# Patient Record
Sex: MALE
[De-identification: names, ages, dates, MRNs, and addresses within clinical notes are randomized per-mention and may not be internally consistent; named-entity substitution may affect disease eponyms.]

---

## 2024-07-26 DIAGNOSIS — M25.561 PAIN IN RIGHT KNEE: ICD-10-CM

## 2024-07-26 DIAGNOSIS — G89.29 PAIN IN RIGHT KNEE: ICD-10-CM

## 2024-07-26 PROBLEM — Z00.00 ENCOUNTER FOR PREVENTIVE HEALTH EXAMINATION: Status: ACTIVE | Noted: 2024-07-26

## 2024-07-29 ENCOUNTER — RESULT REVIEW (OUTPATIENT)
Age: 79
End: 2024-07-29

## 2024-07-29 ENCOUNTER — APPOINTMENT (OUTPATIENT)
Dept: ORTHOPEDIC SURGERY | Facility: CLINIC | Age: 79
End: 2024-07-29
Payer: SELF-PAY

## 2024-07-29 ENCOUNTER — OUTPATIENT (OUTPATIENT)
Dept: OUTPATIENT SERVICES | Facility: HOSPITAL | Age: 79
LOS: 1 days | End: 2024-07-29
Payer: MEDICAID

## 2024-07-29 VITALS
SYSTOLIC BLOOD PRESSURE: 119 MMHG | HEART RATE: 60 BPM | OXYGEN SATURATION: 97 % | WEIGHT: 187.39 LBS | BODY MASS INDEX: 27.76 KG/M2 | HEIGHT: 69 IN | DIASTOLIC BLOOD PRESSURE: 67 MMHG

## 2024-07-29 DIAGNOSIS — I25.10 ATHEROSCLEROTIC HEART DISEASE OF NATIVE CORONARY ARTERY W/OUT ANGINA PECTORIS: ICD-10-CM

## 2024-07-29 DIAGNOSIS — Z82.62 FAMILY HISTORY OF OSTEOPOROSIS: ICD-10-CM

## 2024-07-29 DIAGNOSIS — M17.11 UNILATERAL PRIMARY OSTEOARTHRITIS, RIGHT KNEE: ICD-10-CM

## 2024-07-29 DIAGNOSIS — M25.561 PAIN IN RIGHT KNEE: ICD-10-CM

## 2024-07-29 DIAGNOSIS — I51.9 HEART DISEASE, UNSPECIFIED: ICD-10-CM

## 2024-07-29 DIAGNOSIS — Z83.3 FAMILY HISTORY OF DIABETES MELLITUS: ICD-10-CM

## 2024-07-29 DIAGNOSIS — Z86.79 PERSONAL HISTORY OF OTHER DISEASES OF THE CIRCULATORY SYSTEM: ICD-10-CM

## 2024-07-29 DIAGNOSIS — E03.9 HYPOTHYROIDISM, UNSPECIFIED: ICD-10-CM

## 2024-07-29 DIAGNOSIS — Z82.49 FAMILY HISTORY OF ISCHEMIC HEART DISEASE AND OTHER DISEASES OF THE CIRCULATORY SYSTEM: ICD-10-CM

## 2024-07-29 DIAGNOSIS — Z82.69 FAMILY HISTORY OF OTHER DISEASES OF THE MUSCULOSKELETAL SYSTEM AND CONNECTIVE TISSUE: ICD-10-CM

## 2024-07-29 PROCEDURE — 73564 X-RAY EXAM KNEE 4 OR MORE: CPT

## 2024-07-29 PROCEDURE — 77073 BONE LENGTH STUDIES: CPT | Mod: 26

## 2024-07-29 PROCEDURE — 77073 BONE LENGTH STUDIES: CPT

## 2024-07-29 PROCEDURE — 99204 OFFICE O/P NEW MOD 45 MIN: CPT

## 2024-07-29 PROCEDURE — 73562 X-RAY EXAM OF KNEE 3: CPT | Mod: 26,50,59

## 2024-07-29 RX ORDER — LEVOTHYROXINE SODIUM 0.1 MG/1
100 TABLET ORAL
Refills: 0 | Status: ACTIVE | COMMUNITY

## 2024-07-29 RX ORDER — METOPROLOL SUCCINATE AND HYDROCHLOROTHIAZIDE 12.5; 5 MG/1; MG/1
50-12.5 TABLET ORAL
Refills: 0 | Status: ACTIVE | COMMUNITY

## 2024-07-29 RX ORDER — FOLIC ACID 1 MG/1
1 TABLET ORAL
Refills: 0 | Status: ACTIVE | COMMUNITY

## 2024-07-29 RX ORDER — ATORVASTATIN CALCIUM 40 MG/1
40 TABLET, FILM COATED ORAL
Refills: 0 | Status: ACTIVE | COMMUNITY

## 2024-07-29 NOTE — DISCUSSION/SUMMARY
[de-identified] : Assessment: 79 year old male from [place] who speaks only Colombian with his daughter who works in our cardiothoracic department interpreting today. Mostly right medial knee pain. He has tried conservative treatments but his knee is bowing more and becoming more symptomatic. History of PVD. Bee femoral graft in medial thigh  No significant pain in the PF compartment or lateral joint line on exam.  Right Knee  - Symptoms: Right knee pain medially with weightbearing. No lateral pain or patellofemoral pain. - Exam: Medial joint line tenderness. Laxity of MCL with good endpoint.  - Exam: Intact ACL, PCL, LCL. - Exam: No patellofemoral or lateral joint line tenderness. - Exam: Range of Motion: 3-120 - X-rays: Medial compartment joint space loss in right knee. Severe, Bone on Bone.  - Diagnosis: Medial compartment osteoarthritis, right knee. Severe  11 degrees varus with the left at 5.5 degrees varus and also moderately arthritic. - Non surgical treatments fail to provide adequate relief of osteoarthritis knee symptoms. - Good to Excellent candidate for Medial Compartment Partial Knee Replacement, right knee. This would be less traumatic for him and give him the pain relief he would need without the advanced recovery needs of a total knee.   Plan:   - The patient will consider a Partial Knee Replacement with Dr. Bjorn Paez Jr. MD. - The side, Right.  - The Compartment, Medial  - The procedure will be Derik Robotic-Arm Assisted. - The implants will be cemented. - The implants used are "Restoris MCK" implants from [employer]. - The materials are Titanium tibial implants, CoCr Alloy femoral implants, Polyethylene tibial bearings. - The patient will discuss surgery scheduling with our coordinator - We provided our information packet to take home,review, and prepare for the preoperative visit. - The patient was given the opportunity to schedule their procedure today, or call us when they are ready. - We will assist the patient with all the preoperative scheduling and planning requirements. - Our next visit will be a preoperative visit just prior to surgery to review the procedure, medical clearance, and answer any questions the patient may have.  I look forward to the opportunity to help this patient with their painful knee condition.   Discussion of the risks and benefits of partial knee replacement:  I had a discussion with the patient regarding the findings of their history, exam and imaging. We discussed the option of Partial Knee Replacement using the precision Derik Robotic-Arm System. We discussed the indications, my surgical process, my techniques of surgery, the probable post-operative course and instructions, and the risks and benefits of the procedure.   The knee replacement implants used will be "Restoris MCK" implants from [employer]. The materials are Titanium tibial components, CoCr Alloy femoral components, and Polyethylene (high density plastic) tibial bearings.  The risks of this procedure include but are not limited to the risks of anesthesia, heart attack, stroke, respiratory complications, wound healing problems, skin blisters, delayed wound healing, infection, bleeding, nerve damage, vessel damage, skin sensory changes next to the incisions, loss of motion, scar tissue formation requiring further treatment, blood clots, heterotopic bone formation, implant wear, implant loosening after surgery, allergic responses to the implant materials, continued pain despite proper implant placement, soft tissue pain, and continued knee swelling.  With partial knee replacements there can be progression of arthritis into the other compartments requiring future surgery, and the possible need for further surgery in the future for scar tissue removal, plastic insert change over time, addition of other compartment replacements, and possible revision to total knee replacement.  Although there are no guarantees to success, I feel that the surgery would be very beneficial and there is an excellent chance for a positive outcome.  I look forward to the opportunity to help this patient with their painful knee condition.  The patient had their questions answered to their satisfaction.

## 2024-07-29 NOTE — PHYSICAL EXAM
[de-identified] : Right  Knee/Lower extremity Exam:   Skin: Normal. No erythema, no ecchymosis, no abrasions, no scratches, no tattoos.                    Old Incisions: None.   Knee Joint Swelling: . Positive effusion. Mild   Popliteal Swelling: None	 Pre-Patella Bursa Swelling: None.   Alignment: 		         Varus,  Moderate Passively correctable to near neutral.    Knee Joint Line Tenderness:  Tender at medial joint line.  Not tender at the lateral joint line.    Not tender in the patellofemoral compartment.   Soft Tissue Tenderness: None.   Medial Collateral Ligament Laxity:  Good endpoint.                                                        Medial opening to valgus stress.   Moderate.      Lateral Collateral Ligament Laxity:           Normal laxity. Good endpoint.   ROM Extension: 4 degrees.    ROM Flexion: 120 degrees.      ACL Testing: 			 Stable ACL. Good Endpoint.                                                                 Lachman Test: Negative  Anterior Drawer Test: Negative   PCL Testing: 			 Stable PCL.  Good Endpoint.                                                                Posterior Drawer Test: Negative   Patella Compression Test: Negative  Patellofemoral Crepitus: None.    Patellofemoral Apprehension Testing: Negative.  Patellofemoral Laxity: Normal.  Motor Strength: 			 Quadriceps strength is 5 out of 5                                                                 Hamstring strength is 5 out of 5                                                                Ankle dorsiflexion strength is 5 out of 5                                                               Ankle plantarflexion strength is 5 out of 5   Sensation: Light tough sensation in the lower extremity is grossly normal.                                       Pulses: 				 Pulses are palpable at the ankle at the Dorsalis Pedis Artery.                                                                Pulses are palpable at the Posterior Tibialis Artery.                                                                 Hip Motion: The patient has full and painless hip range of motion.                                                          Lumbar Spine Symptoms: Negative straight leg raise.                                                                Gait: Limping Gait.  Abnormal gait.    Assistive Devices: 	None   [de-identified] : X-ray of the Left Knee:   AP Standing View: Medial joint space loss. Moderate Lateral joint space preserved with tibial plateau rim osteophytes posteriorly.   PA Flexion View: Medial joint space loss.Moderate to severe Lateral joint space preserved.     Lateral View: Patellofemoral joint space preserved. small inferior osteophytes.   [place] View: Patellofemoral joint space is preserved. Patellofemoral joint central tracking. Large medial femoral osteophytes. small lateral femoral osteophytes.       X-ray of the Right Knee:   AP Standing View: Medial joint space loss. Severe. Lateral joint space preserved. Lateral joint space preserved with tibial plateau rim osteophytes posteriorly.  PA Flexion View: Medial joint space loss. Severe. Lateral joint space preserved with some mild lateral femoral and tibial osteophytes.   Lateral View: Patellofemoral joint space preserved. Moderate anterior femoral osteophytes and inferior patella osteophytes. anterior tibial osteophytes.  [place] View: Patellofemoral joint space is preserved. Patellofemoral joint central tracking. Large medial femoral osteophytes. Mild lateral femoral osteophytes.   Bilateral Hip to Ankle Standing View: Alignment: Varus 11 degrees on the right and 5.5 on the left. Medial joint space loss. Severe. Hips: No significant changes

## 2024-07-29 NOTE — HISTORY OF PRESENT ILLNESS
[de-identified] : Bunny is a 79 year old male who presents today right knee pain. He states the pain started years ago. He describes medial pain, no significant lateral and knee cap pain. He has tried rest, heat, acupuncture, cane, NSAIDs and PT in the past to help with the pain. He was referred by Dr. Kline and Shanel..  [ Right Knee  ]  Onset of Knee Symptoms: years ago  Knee Symptoms: Pain with Activity, Walking and Decrease Walk Distance  Location of Pain: Inner Side of Knee  Duration of Pain: Constant  Intensity of Pain: Pain Level: mild 2  Character of Pain: Aching  Painful Activities: Walking, Walking after Sitting, Rising From a Seat, Getting On / Off a Toilet  Knee History: History of Osteoarthritis  Non-Surgical Knee Treatments: Rest, Heat, Acupuncture, Cane, Walker, NSAIDs and Physical Therapy  Surgical Knee History: none

## 2024-07-29 NOTE — DISCUSSION/SUMMARY
[de-identified] : Assessment: 79 year old male from [place] who speaks only Puerto Rican with his daughter who works in our cardiothoracic department interpreting today. Mostly right medial knee pain. He has tried conservative treatments but his knee is bowing more and becoming more symptomatic. History of PVD. Bee femoral graft in medial thigh  No significant pain in the PF compartment or lateral joint line on exam.  Right Knee  - Symptoms: Right knee pain medially with weightbearing. No lateral pain or patellofemoral pain. - Exam: Medial joint line tenderness. Laxity of MCL with good endpoint.  - Exam: Intact ACL, PCL, LCL. - Exam: No patellofemoral or lateral joint line tenderness. - Exam: Range of Motion: 3-120 - X-rays: Medial compartment joint space loss in right knee. Severe, Bone on Bone.  - Diagnosis: Medial compartment osteoarthritis, right knee. Severe  11 degrees varus with the left at 5.5 degrees varus and also moderately arthritic. - Non surgical treatments fail to provide adequate relief of osteoarthritis knee symptoms. - Good to Excellent candidate for Medial Compartment Partial Knee Replacement, right knee. This would be less traumatic for him and give him the pain relief he would need without the advanced recovery needs of a total knee.   Plan:   - The patient will consider a Partial Knee Replacement with Dr. Bjorn Paez Jr. MD. - The side, Right.  - The Compartment, Medial  - The procedure will be Derik Robotic-Arm Assisted. - The implants will be cemented. - The implants used are "Restoris MCK" implants from [employer]. - The materials are Titanium tibial implants, CoCr Alloy femoral implants, Polyethylene tibial bearings. - The patient will discuss surgery scheduling with our coordinator - We provided our information packet to take home,review, and prepare for the preoperative visit. - The patient was given the opportunity to schedule their procedure today, or call us when they are ready. - We will assist the patient with all the preoperative scheduling and planning requirements. - Our next visit will be a preoperative visit just prior to surgery to review the procedure, medical clearance, and answer any questions the patient may have.  I look forward to the opportunity to help this patient with their painful knee condition.   Discussion of the risks and benefits of partial knee replacement:  I had a discussion with the patient regarding the findings of their history, exam and imaging. We discussed the option of Partial Knee Replacement using the precision Derik Robotic-Arm System. We discussed the indications, my surgical process, my techniques of surgery, the probable post-operative course and instructions, and the risks and benefits of the procedure.   The knee replacement implants used will be "Restoris MCK" implants from [employer]. The materials are Titanium tibial components, CoCr Alloy femoral components, and Polyethylene (high density plastic) tibial bearings.  The risks of this procedure include but are not limited to the risks of anesthesia, heart attack, stroke, respiratory complications, wound healing problems, skin blisters, delayed wound healing, infection, bleeding, nerve damage, vessel damage, skin sensory changes next to the incisions, loss of motion, scar tissue formation requiring further treatment, blood clots, heterotopic bone formation, implant wear, implant loosening after surgery, allergic responses to the implant materials, continued pain despite proper implant placement, soft tissue pain, and continued knee swelling.  With partial knee replacements there can be progression of arthritis into the other compartments requiring future surgery, and the possible need for further surgery in the future for scar tissue removal, plastic insert change over time, addition of other compartment replacements, and possible revision to total knee replacement.  Although there are no guarantees to success, I feel that the surgery would be very beneficial and there is an excellent chance for a positive outcome.  I look forward to the opportunity to help this patient with their painful knee condition.  The patient had their questions answered to their satisfaction.

## 2024-07-29 NOTE — PHYSICAL EXAM
[de-identified] : Right  Knee/Lower extremity Exam:   Skin: Normal. No erythema, no ecchymosis, no abrasions, no scratches, no tattoos.                    Old Incisions: None.   Knee Joint Swelling: . Positive effusion. Mild   Popliteal Swelling: None	 Pre-Patella Bursa Swelling: None.   Alignment: 		         Varus,  Moderate Passively correctable to near neutral.    Knee Joint Line Tenderness:  Tender at medial joint line.  Not tender at the lateral joint line.    Not tender in the patellofemoral compartment.   Soft Tissue Tenderness: None.   Medial Collateral Ligament Laxity:  Good endpoint.                                                        Medial opening to valgus stress.   Moderate.      Lateral Collateral Ligament Laxity:           Normal laxity. Good endpoint.   ROM Extension: 4 degrees.    ROM Flexion: 120 degrees.      ACL Testing: 			 Stable ACL. Good Endpoint.                                                                 Lachman Test: Negative  Anterior Drawer Test: Negative   PCL Testing: 			 Stable PCL.  Good Endpoint.                                                                Posterior Drawer Test: Negative   Patella Compression Test: Negative  Patellofemoral Crepitus: None.    Patellofemoral Apprehension Testing: Negative.  Patellofemoral Laxity: Normal.  Motor Strength: 			 Quadriceps strength is 5 out of 5                                                                 Hamstring strength is 5 out of 5                                                                Ankle dorsiflexion strength is 5 out of 5                                                               Ankle plantarflexion strength is 5 out of 5   Sensation: Light tough sensation in the lower extremity is grossly normal.                                       Pulses: 				 Pulses are palpable at the ankle at the Dorsalis Pedis Artery.                                                                Pulses are palpable at the Posterior Tibialis Artery.                                                                 Hip Motion: The patient has full and painless hip range of motion.                                                          Lumbar Spine Symptoms: Negative straight leg raise.                                                                Gait: Limping Gait.  Abnormal gait.    Assistive Devices: 	None   [de-identified] : X-ray of the Left Knee:   AP Standing View: Medial joint space loss. Moderate Lateral joint space preserved with tibial plateau rim osteophytes posteriorly.   PA Flexion View: Medial joint space loss.Moderate to severe Lateral joint space preserved.     Lateral View: Patellofemoral joint space preserved. small inferior osteophytes.   [place] View: Patellofemoral joint space is preserved. Patellofemoral joint central tracking. Large medial femoral osteophytes. small lateral femoral osteophytes.       X-ray of the Right Knee:   AP Standing View: Medial joint space loss. Severe. Lateral joint space preserved. Lateral joint space preserved with tibial plateau rim osteophytes posteriorly.  PA Flexion View: Medial joint space loss. Severe. Lateral joint space preserved with some mild lateral femoral and tibial osteophytes.   Lateral View: Patellofemoral joint space preserved. Moderate anterior femoral osteophytes and inferior patella osteophytes. anterior tibial osteophytes.  [place] View: Patellofemoral joint space is preserved. Patellofemoral joint central tracking. Large medial femoral osteophytes. Mild lateral femoral osteophytes.   Bilateral Hip to Ankle Standing View: Alignment: Varus 11 degrees on the right and 5.5 on the left. Medial joint space loss. Severe. Hips: No significant changes

## 2024-07-29 NOTE — HISTORY OF PRESENT ILLNESS
[de-identified] : Bunny is a 79 year old male who presents today right knee pain. He states the pain started years ago. He describes medial pain, no significant lateral and knee cap pain. He has tried rest, heat, acupuncture, cane, NSAIDs and PT in the past to help with the pain. He was referred by Dr. Kline and Shanel..  [ Right Knee  ]  Onset of Knee Symptoms: years ago  Knee Symptoms: Pain with Activity, Walking and Decrease Walk Distance  Location of Pain: Inner Side of Knee  Duration of Pain: Constant  Intensity of Pain: Pain Level: mild 2  Character of Pain: Aching  Painful Activities: Walking, Walking after Sitting, Rising From a Seat, Getting On / Off a Toilet  Knee History: History of Osteoarthritis  Non-Surgical Knee Treatments: Rest, Heat, Acupuncture, Cane, Walker, NSAIDs and Physical Therapy  Surgical Knee History: none

## 2024-08-06 ENCOUNTER — APPOINTMENT (OUTPATIENT)
Dept: OTOLARYNGOLOGY | Facility: CLINIC | Age: 79
End: 2024-08-06

## 2024-08-06 PROBLEM — H61.23 BILATERAL IMPACTED CERUMEN: Status: ACTIVE | Noted: 2024-08-06

## 2024-08-06 PROBLEM — H93.13 BILATERAL TINNITUS: Status: ACTIVE | Noted: 2024-08-06

## 2024-08-06 PROBLEM — H90.3 BILATERAL SENSORINEURAL HEARING LOSS: Status: ACTIVE | Noted: 2024-08-06

## 2024-08-06 PROCEDURE — 69210 REMOVE IMPACTED EAR WAX UNI: CPT

## 2024-08-06 PROCEDURE — 92550 TYMPANOMETRY & REFLEX THRESH: CPT | Mod: 52

## 2024-08-06 PROCEDURE — 92557 COMPREHENSIVE HEARING TEST: CPT

## 2024-08-06 NOTE — ASSESSMENT
[FreeTextEntry1] : RTC for an ear cleaning in 6 months; annual audios, sooner prn any changes. HAE as requested

## 2024-08-06 NOTE — HISTORY OF PRESENT ILLNESS
[de-identified] : Mostly Farsi speaker; his daughter translated by request and assisted w/ the hx Hearing loss developing over a 2 yr period; he uses headphones to be able to hear the news. Nonpulsatile tinnitus in both ears from time to time. Denies: ear pain, drainage, frequent loud noise exp/shooting, frequent infections, hx of ear surgery or IV antibiotics/chemo; denies a FHx of hearing loss. Qtip use: no

## 2024-08-06 NOTE — PHYSICAL EXAM
[Binocular Microscopic Exam] : Binocular microscopic exam was performed [FreeTextEntry8] : Obstructing cerumen was removed with a hook & suction [FreeTextEntry9] : Obstructing cerumen was removed with a hook & suction [Normal] : no masses and lesions seen, face is symmetric

## 2024-08-06 NOTE — HISTORY OF PRESENT ILLNESS
[de-identified] : Mostly Farsi speaker; his daughter translated by request and assisted w/ the hx Hearing loss developing over a 2 yr period; he uses headphones to be able to hear the news. Nonpulsatile tinnitus in both ears from time to time. Denies: ear pain, drainage, frequent loud noise exp/shooting, frequent infections, hx of ear surgery or IV antibiotics/chemo; denies a FHx of hearing loss. Qtip use: no

## 2024-08-06 NOTE — DATA REVIEWED
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

## 2024-08-06 NOTE — DATA REVIEWED
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

## 2024-10-31 ENCOUNTER — OUTPATIENT (OUTPATIENT)
Dept: OUTPATIENT SERVICES | Facility: HOSPITAL | Age: 79
LOS: 1 days | End: 2024-10-31
Payer: MEDICAID

## 2024-10-31 ENCOUNTER — RESULT REVIEW (OUTPATIENT)
Age: 79
End: 2024-10-31

## 2024-10-31 DIAGNOSIS — R01.1 CARDIAC MURMUR, UNSPECIFIED: ICD-10-CM

## 2024-10-31 PROCEDURE — 93306 TTE W/DOPPLER COMPLETE: CPT | Mod: 26

## 2024-10-31 PROCEDURE — 93306 TTE W/DOPPLER COMPLETE: CPT

## 2024-12-17 ENCOUNTER — APPOINTMENT (OUTPATIENT)
Dept: HEART AND VASCULAR | Facility: CLINIC | Age: 79
End: 2024-12-17

## 2024-12-17 ENCOUNTER — NON-APPOINTMENT (OUTPATIENT)
Age: 79
End: 2024-12-17

## 2024-12-17 VITALS
BODY MASS INDEX: 28.14 KG/M2 | HEIGHT: 69 IN | OXYGEN SATURATION: 98 % | DIASTOLIC BLOOD PRESSURE: 73 MMHG | HEART RATE: 82 BPM | WEIGHT: 190 LBS | SYSTOLIC BLOOD PRESSURE: 115 MMHG

## 2024-12-17 PROCEDURE — 99202 OFFICE O/P NEW SF 15 MIN: CPT

## 2024-12-17 PROCEDURE — 93000 ELECTROCARDIOGRAM COMPLETE: CPT | Mod: NC

## 2024-12-17 RX ORDER — METOPROLOL TARTRATE 25 MG/1
25 TABLET ORAL
Refills: 0 | Status: ACTIVE | COMMUNITY

## 2024-12-31 DIAGNOSIS — Z47.1 AFTERCARE FOLLOWING JOINT REPLACEMENT SURGERY: ICD-10-CM

## 2024-12-31 DIAGNOSIS — Z01.812 ENCOUNTER FOR PREPROCEDURAL LABORATORY EXAMINATION: ICD-10-CM

## 2024-12-31 DIAGNOSIS — Z22.322 CARRIER OR SUSPECTED CARRIER OF METHICILLIN RESISTANT STAPHYLOCOCCUS AUREUS: ICD-10-CM

## 2024-12-31 DIAGNOSIS — Z96.651 PRESENCE OF RIGHT ARTIFICIAL KNEE JOINT: ICD-10-CM

## 2024-12-31 DIAGNOSIS — Z96.651 AFTERCARE FOLLOWING JOINT REPLACEMENT SURGERY: ICD-10-CM

## 2025-01-07 ENCOUNTER — NON-APPOINTMENT (OUTPATIENT)
Age: 80
End: 2025-01-07

## 2025-01-07 ASSESSMENT — KOOS JR
STRAIGHTENING KNEE FULLY: MODERATE
KOOS JR RAW SCORE: 15
BENDING TO THE FLOOR TO PICK UP OBJECT: MILD
RISING FROM SITTING: MODERATE
STANDING UPRIGHT: MODERATE
HOW SEVERE IS YOUR KNEE STIFFNESS AFTER FIRST WAKING IN MORNING: SEVERE
GOING UP OR DOWN STAIRS: MODERATE
TWISING OR PIVOTING ON KNEE: SEVERE

## 2025-01-08 ENCOUNTER — NON-APPOINTMENT (OUTPATIENT)
Age: 80
End: 2025-01-08

## 2025-01-10 ENCOUNTER — LABORATORY RESULT (OUTPATIENT)
Age: 80
End: 2025-01-10

## 2025-01-10 ENCOUNTER — APPOINTMENT (OUTPATIENT)
Dept: ORTHOPEDIC SURGERY | Facility: CLINIC | Age: 80
End: 2025-01-10
Payer: SELF-PAY

## 2025-01-10 ENCOUNTER — OUTPATIENT (OUTPATIENT)
Dept: OUTPATIENT SERVICES | Facility: HOSPITAL | Age: 80
LOS: 1 days | End: 2025-01-10
Payer: MEDICAID

## 2025-01-10 ENCOUNTER — NON-APPOINTMENT (OUTPATIENT)
Age: 80
End: 2025-01-10

## 2025-01-10 ENCOUNTER — APPOINTMENT (OUTPATIENT)
Dept: CT IMAGING | Facility: CLINIC | Age: 80
End: 2025-01-10

## 2025-01-10 VITALS
OXYGEN SATURATION: 96 % | DIASTOLIC BLOOD PRESSURE: 75 MMHG | BODY MASS INDEX: 28.14 KG/M2 | SYSTOLIC BLOOD PRESSURE: 119 MMHG | WEIGHT: 190 LBS | HEIGHT: 69 IN | HEART RATE: 75 BPM

## 2025-01-10 DIAGNOSIS — M17.11 UNILATERAL PRIMARY OSTEOARTHRITIS, RIGHT KNEE: ICD-10-CM

## 2025-01-10 DIAGNOSIS — M25.561 PAIN IN RIGHT KNEE: ICD-10-CM

## 2025-01-10 DIAGNOSIS — G89.29 PAIN IN RIGHT KNEE: ICD-10-CM

## 2025-01-10 PROCEDURE — 73700 CT LOWER EXTREMITY W/O DYE: CPT

## 2025-01-10 PROCEDURE — 99215 OFFICE O/P EST HI 40 MIN: CPT

## 2025-01-10 PROCEDURE — 73700 CT LOWER EXTREMITY W/O DYE: CPT | Mod: 26,RT

## 2025-01-13 LAB
ALBUMIN SERPL ELPH-MCNC: 4.6 G/DL
ALP BLD-CCNC: 71 U/L
ALT SERPL-CCNC: 45 U/L
ANION GAP SERPL CALC-SCNC: 11 MMOL/L
APPEARANCE: CLEAR
APTT BLD: 28.9 SEC
AST SERPL-CCNC: 25 U/L
BACTERIA UR CULT: NORMAL
BACTERIA: NEGATIVE /HPF
BASOPHILS # BLD AUTO: 0.03 K/UL
BASOPHILS NFR BLD AUTO: 0.4 %
BILIRUB SERPL-MCNC: 1.1 MG/DL
BILIRUBIN URINE: NEGATIVE
BLOOD URINE: NEGATIVE
BUN SERPL-MCNC: 12 MG/DL
CALCIUM SERPL-MCNC: 8.7 MG/DL
CAST: 0 /LPF
CHLORIDE SERPL-SCNC: 101 MMOL/L
CO2 SERPL-SCNC: 25 MMOL/L
COLOR: YELLOW
CREAT SERPL-MCNC: 0.79 MG/DL
EGFR: 90 ML/MIN/1.73M2
EOSINOPHIL # BLD AUTO: 0.17 K/UL
EOSINOPHIL NFR BLD AUTO: 2.2 %
EPITHELIAL CELLS: 0 /HPF
ESTIMATED AVERAGE GLUCOSE: 111 MG/DL
GLUCOSE QUALITATIVE U: NEGATIVE MG/DL
GLUCOSE SERPL-MCNC: 76 MG/DL
HBA1C MFR BLD HPLC: 5.5 %
HCT VFR BLD CALC: 26.6 %
HGB BLD-MCNC: 8.4 G/DL
IMM GRANULOCYTES NFR BLD AUTO: 1 %
INR PPP: 0.94 RATIO
KETONES URINE: NEGATIVE MG/DL
LEUKOCYTE ESTERASE URINE: ABNORMAL
LYMPHOCYTES # BLD AUTO: 2.08 K/UL
LYMPHOCYTES NFR BLD AUTO: 27 %
MAN DIFF?: NORMAL
MCHC RBC-ENTMCNC: 22 PG
MCHC RBC-ENTMCNC: 31.6 G/DL
MCV RBC AUTO: 69.8 FL
MICROSCOPIC-UA: NORMAL
MONOCYTES # BLD AUTO: 0.73 K/UL
MONOCYTES NFR BLD AUTO: 9.5 %
MRSA SPEC QL CULT: NOT DETECTED
NEUTROPHILS # BLD AUTO: 4.61 K/UL
NEUTROPHILS NFR BLD AUTO: 59.9 %
NITRITE URINE: NEGATIVE
PH URINE: 6
PLATELET # BLD AUTO: 262 K/UL
POTASSIUM SERPL-SCNC: 4.5 MMOL/L
PROT SERPL-MCNC: 7.4 G/DL
PROTEIN URINE: NEGATIVE MG/DL
PT BLD: 11.1 SEC
RBC # BLD: 3.81 M/UL
RBC # FLD: 16.8 %
RED BLOOD CELLS URINE: 1 /HPF
SODIUM SERPL-SCNC: 137 MMOL/L
SPECIFIC GRAVITY URINE: 1.01
STAPH AUREUS (SA): NOT DETECTED
TSH SERPL-ACNC: 1.53 UIU/ML
UROBILINOGEN URINE: 0.2 MG/DL
WBC # FLD AUTO: 7.7 K/UL
WHITE BLOOD CELLS URINE: 1 /HPF

## 2025-01-16 RX ORDER — CEFADROXIL 500 MG/1
500 CAPSULE ORAL TWICE DAILY
Qty: 14 | Refills: 1 | Status: ACTIVE | COMMUNITY
Start: 2025-01-16 | End: 1900-01-01

## 2025-01-16 RX ORDER — HYDROCODONE BITARTRATE AND ACETAMINOPHEN 7.5; 325 MG/1; MG/1
7.5-325 TABLET ORAL
Qty: 24 | Refills: 0 | Status: ACTIVE | COMMUNITY
Start: 2025-01-16 | End: 1900-01-01

## 2025-01-22 VITALS
HEART RATE: 70 BPM | TEMPERATURE: 97 F | RESPIRATION RATE: 16 BRPM | DIASTOLIC BLOOD PRESSURE: 67 MMHG | OXYGEN SATURATION: 96 % | SYSTOLIC BLOOD PRESSURE: 119 MMHG | WEIGHT: 182.98 LBS | HEIGHT: 68 IN

## 2025-01-22 RX ORDER — TAMSULOSIN HYDROCHLORIDE 0.4 MG/1
1 CAPSULE ORAL
Refills: 0 | DISCHARGE

## 2025-01-22 RX ORDER — ATORVASTATIN CALCIUM 40 MG/1
1 TABLET, FILM COATED ORAL
Refills: 0 | DISCHARGE

## 2025-01-22 RX ORDER — POVIDONE IODINE USP, 10% W/W 10 MG/ML
1 SWAB TOPICAL ONCE
Refills: 0 | Status: DISCONTINUED | OUTPATIENT
Start: 2025-01-23 | End: 2025-01-23

## 2025-01-22 NOTE — H&P ADULT - NSICDXPASTMEDICALHX_GEN_ALL_CORE_FT
PAST MEDICAL HISTORY:  Osteoarthritis of right knee      PAST MEDICAL HISTORY:  CAD (coronary artery disease)     Glaucoma     Osteoarthritis of right knee     PVD (peripheral vascular disease)

## 2025-01-22 NOTE — ASU PATIENT PROFILE, ADULT - FALL HARM RISK - HARM RISK INTERVENTIONS

## 2025-01-22 NOTE — ASU PATIENT PROFILE, ADULT - NS PRO PASSIVE SMOKE EXP
no hearing difficulty/no ear pain/no tinnitus/no vertigo/no nasal discharge/no post-nasal discharge/no nose bleeds/no recurrent cold sores/no abnormal taste sensation/no gum bleeding/no throat pain No

## 2025-01-22 NOTE — H&P ADULT - NSHPPHYSICALEXAM_GEN_ALL_CORE
MSK: + decreased ROM 2/2 pain, right knee         Remainder of exam per medical clearance note Gen: Alert and oriented, NAD  MSK: + decreased ROM 2/2 pain, right knee   No evidence of deformity or open wound   2+ DP pulses palpable bilaterally, skin wwp, cap refill brisk   SILT to bilateral distal lower extremities  EHL/FHL/TA/GS 5/5 bilaterally    Remainder of exam per medical clearance note

## 2025-01-22 NOTE — PRE-OP CHECKLIST - WAS PATIENT ON BETA BLOCKER?
April calling from [facility]# [phone]  Karie Webster RN Nurse Triage     No

## 2025-01-22 NOTE — H&P ADULT - PROBLEM SELECTOR PLAN 1
Admit to Orthopaedic Service.  Presents today for elective right UKR   Pt medically stable and cleared for procedure today by Dr. Howe

## 2025-01-22 NOTE — H&P ADULT - HISTORY OF PRESENT ILLNESS
79M c/o right knee pain x   Has the patient used any narcotic more than 90 days prior to the date of surgery? YES or NO     Present for unicondylar right knee arthroplasty  79M c/o right knee pain x many years. History obtained primarily from patient's daughter. No prior surgeries to his right knee. No numbness/tingling. He takes Aleve and Naproxen occasionally at home for pain with minimal relief of symptoms. Pain persists despite conservative measures. Ambulates occasionally with the assistance of a cane.   Denies history of blood clots/seizures. Denies CP/SOB/N/V.     Has the patient used any narcotic more than 90 days prior to the date of surgery?  NO     Present for unicondylar right knee arthroplasty

## 2025-01-22 NOTE — H&P ADULT - NSHPLABSRESULTS_GEN_ALL_CORE
Preop CBC, BMP, PT/PTT/INR, UA - WNL per medical clearance   H/H 8.41/26.6   A1C 5.5   Cr .79   Preop EKG - sinus rhythm -  WNL per medical clearance   Nasal swab negative   Echo 10/31/24 EF 55-60%    DOS

## 2025-01-22 NOTE — ASU PATIENT PROFILE, ADULT - NSICDXPASTMEDICALHX_GEN_ALL_CORE_FT
PAST MEDICAL HISTORY:  CAD (coronary artery disease)     Osteoarthritis of right knee     PVD (peripheral vascular disease)      PAST MEDICAL HISTORY:  CAD (coronary artery disease)     Glaucoma     Osteoarthritis of right knee     PVD (peripheral vascular disease)

## 2025-01-22 NOTE — ASU PATIENT PROFILE, ADULT - NSICDXPASTSURGICALHX_GEN_ALL_CORE_FT
PAST SURGICAL HISTORY:  No significant past surgical history PAST SURGICAL HISTORY:  Dissection of left femoral artery graft    H/O hernia repair

## 2025-01-23 ENCOUNTER — RESULT REVIEW (OUTPATIENT)
Age: 80
End: 2025-01-23

## 2025-01-23 ENCOUNTER — APPOINTMENT (OUTPATIENT)
Dept: ORTHOPEDIC SURGERY | Facility: HOSPITAL | Age: 80
End: 2025-01-23

## 2025-01-23 ENCOUNTER — OUTPATIENT (OUTPATIENT)
Dept: OUTPATIENT SERVICES | Facility: HOSPITAL | Age: 80
LOS: 1 days | Discharge: HOME | End: 2025-01-23
Payer: MEDICAID

## 2025-01-23 VITALS — OXYGEN SATURATION: 99 % | DIASTOLIC BLOOD PRESSURE: 66 MMHG | HEART RATE: 77 BPM | SYSTOLIC BLOOD PRESSURE: 110 MMHG

## 2025-01-23 DIAGNOSIS — I25.10 ATHEROSCLEROTIC HEART DISEASE OF NATIVE CORONARY ARTERY WITHOUT ANGINA PECTORIS: ICD-10-CM

## 2025-01-23 DIAGNOSIS — M17.11 UNILATERAL PRIMARY OSTEOARTHRITIS, RIGHT KNEE: ICD-10-CM

## 2025-01-23 DIAGNOSIS — Z98.890 OTHER SPECIFIED POSTPROCEDURAL STATES: Chronic | ICD-10-CM

## 2025-01-23 DIAGNOSIS — I77.77 DISSECTION OF ARTERY OF LOWER EXTREMITY: Chronic | ICD-10-CM

## 2025-01-23 DIAGNOSIS — I73.9 PERIPHERAL VASCULAR DISEASE, UNSPECIFIED: ICD-10-CM

## 2025-01-23 PROCEDURE — 27446 REVISION OF KNEE JOINT: CPT | Mod: AS,RT

## 2025-01-23 PROCEDURE — 86850 RBC ANTIBODY SCREEN: CPT

## 2025-01-23 PROCEDURE — 86901 BLOOD TYPING SEROLOGIC RH(D): CPT

## 2025-01-23 PROCEDURE — C1713: CPT

## 2025-01-23 PROCEDURE — 27437 REVISE KNEECAP: CPT | Mod: 59,RT

## 2025-01-23 PROCEDURE — 27446 REVISION OF KNEE JOINT: CPT | Mod: RT

## 2025-01-23 PROCEDURE — 73560 X-RAY EXAM OF KNEE 1 OR 2: CPT | Mod: 26,RT

## 2025-01-23 PROCEDURE — 97161 PT EVAL LOW COMPLEX 20 MIN: CPT

## 2025-01-23 PROCEDURE — S2900: CPT

## 2025-01-23 PROCEDURE — C1776: CPT

## 2025-01-23 PROCEDURE — 86900 BLOOD TYPING SEROLOGIC ABO: CPT

## 2025-01-23 PROCEDURE — 73560 X-RAY EXAM OF KNEE 1 OR 2: CPT

## 2025-01-23 DEVICE — IMPLANTABLE DEVICE: Type: IMPLANTABLE DEVICE | Site: RIGHT | Status: FUNCTIONAL

## 2025-01-23 DEVICE — MAKO BONE PIN 3.2MM X 110MM: Type: IMPLANTABLE DEVICE | Site: RIGHT | Status: FUNCTIONAL

## 2025-01-23 DEVICE — MAKO BONE PIN 3.2MM X 140MM: Type: IMPLANTABLE DEVICE | Site: RIGHT | Status: FUNCTIONAL

## 2025-01-23 DEVICE — CEMENT SIMPLEX WITH TOBRAMYCIN: Type: IMPLANTABLE DEVICE | Site: RIGHT | Status: FUNCTIONAL

## 2025-01-23 RX ORDER — OXYCODONE HCL 15 MG
10 TABLET ORAL
Refills: 0 | Status: DISCONTINUED | OUTPATIENT
Start: 2025-01-23 | End: 2025-01-23

## 2025-01-23 RX ORDER — APREPITANT 40 MG/1
40 CAPSULE ORAL ONCE
Refills: 0 | Status: COMPLETED | OUTPATIENT
Start: 2025-01-23 | End: 2025-01-23

## 2025-01-23 RX ORDER — SENNOSIDES 8.6 MG/1
2 TABLET, FILM COATED ORAL AT BEDTIME
Refills: 0 | Status: DISCONTINUED | OUTPATIENT
Start: 2025-01-23 | End: 2025-01-23

## 2025-01-23 RX ORDER — LEVOTHYROXINE SODIUM 175 UG/1
1 TABLET ORAL
Refills: 0 | DISCHARGE

## 2025-01-23 RX ORDER — OXYCODONE HCL 15 MG
5 TABLET ORAL
Refills: 0 | Status: DISCONTINUED | OUTPATIENT
Start: 2025-01-23 | End: 2025-01-23

## 2025-01-23 RX ORDER — ACETAMINOPHEN 80 MG/.8ML
1000 SOLUTION/ DROPS ORAL EVERY 8 HOURS
Refills: 0 | Status: DISCONTINUED | OUTPATIENT
Start: 2025-01-23 | End: 2025-01-23

## 2025-01-23 RX ORDER — ONDANSETRON 4 MG/1
4 TABLET ORAL EVERY 6 HOURS
Refills: 0 | Status: DISCONTINUED | OUTPATIENT
Start: 2025-01-23 | End: 2025-01-23

## 2025-01-23 RX ORDER — POLYETHYLENE GLYCOL 3350 17 G/DOSE
17 POWDER (GRAM) ORAL AT BEDTIME
Refills: 0 | Status: DISCONTINUED | OUTPATIENT
Start: 2025-01-23 | End: 2025-01-23

## 2025-01-23 RX ORDER — SODIUM CHLORIDE 9 MG/ML
1000 INJECTION, SOLUTION INTRAVENOUS
Refills: 0 | Status: DISCONTINUED | OUTPATIENT
Start: 2025-01-23 | End: 2025-01-23

## 2025-01-23 RX ORDER — ACETAMINOPHEN 80 MG/.8ML
1000 SOLUTION/ DROPS ORAL ONCE
Refills: 0 | Status: COMPLETED | OUTPATIENT
Start: 2025-01-23 | End: 2025-01-23

## 2025-01-23 RX ORDER — CHLORHEXIDINE GLUCONATE 1.2 MG/ML
1 RINSE ORAL EVERY 12 HOURS
Refills: 0 | Status: DISCONTINUED | OUTPATIENT
Start: 2025-01-23 | End: 2025-01-23

## 2025-01-23 RX ORDER — ASPIRIN 81 MG
1 TABLET, DELAYED RELEASE (ENTERIC COATED) ORAL
Qty: 0 | Refills: 0 | DISCHARGE

## 2025-01-23 RX ORDER — ACETAMINOPHEN 80 MG/.8ML
2 SOLUTION/ DROPS ORAL
Qty: 100 | Refills: 0
Start: 2025-01-23

## 2025-01-23 RX ORDER — MAGNESIUM HYDROXIDE 400 MG/5ML
30 SUSPENSION, ORAL (FINAL DOSE FORM) ORAL DAILY
Refills: 0 | Status: DISCONTINUED | OUTPATIENT
Start: 2025-01-23 | End: 2025-01-23

## 2025-01-23 RX ORDER — HYDROMORPHONE HCL 4 MG
0.5 TABLET ORAL
Refills: 0 | Status: DISCONTINUED | OUTPATIENT
Start: 2025-01-23 | End: 2025-01-23

## 2025-01-23 RX ADMIN — APREPITANT 40 MILLIGRAM(S): 40 CAPSULE ORAL at 06:43

## 2025-01-23 RX ADMIN — ACETAMINOPHEN 1000 MILLIGRAM(S): 80 SOLUTION/ DROPS ORAL at 06:43

## 2025-01-23 RX ADMIN — CHLORHEXIDINE GLUCONATE 1 APPLICATION(S): 1.2 RINSE ORAL at 06:47

## 2025-01-23 NOTE — PHYSICAL THERAPY INITIAL EVALUATION ADULT - ADDITIONAL COMMENTS
Patient lives with spouse and daughter in elevator accessible apartment. Does not use any Assistive Devices at baseline. Denies recent Hx of falls.

## 2025-01-23 NOTE — ASU DISCHARGE PLAN (ADULT/PEDIATRIC) - ASU DC SPECIAL INSTRUCTIONSFT
Please follow Dr. Paez’s instruction sheets  ACTIVITY:   - Weight bear as tolerated with assistive device. No strenuous activity, heavy lifting, driving or returning to work until cleared by MD.   - Apply a cryocuff or ice to the surgical site as frequently as possible. Ice should NEVER be placed directly on the skin. Wearing compression stockings during the first week after surgery can help reduce swelling in your knee, calf and foot.     DO place a pillow under your heel when elevating the leg, to encourage full extension of the knee. Do NOT place a pillow behind your knee for comfort, as this can lead to permanent difficulty straightening your leg. It is normal to develop some swelling in the leg, ankle, and foot because of gravity.     DRESSING/SHOWERING:   The ace bandages will be removed in first postop visit. The dressings underneath are water resistant and you may then shower. No soaking in tubs, keep dry at all times. No ointments or creams to incision.    MEDICATION/ANTICOAGULATION:   -You have been prescribed aspirin, as a preventative to help prevent postoperative blood clots. Please take this medication as prescribed. Twice a day for 4 weeks then can resume daily.  - You have been prescribed medications for pain:     - Tylenol for mild to moderate pain. Do not exceed 3,000mg daily.     - For more severe pain, take Tylenol with the addition of narcotic pain medication. Take this medication as prescribed. This medication may cause drowsiness or dizziness. Do not operate machinery. This medication may cause constipation.   - For any additional medications, follow instructions on the bottle.    -Try to have regular bowel movements. Take stool softener or laxative if necessary. You may wish to take Miralax daily until you have regular bowel movements.    - If you have been prescribed Aspirin or an anti-inflammatory, recommend taking omeprazole (or similar) once a day, before breakfast, until no longer taking Aspirin or anti-inflammatory. This will help protect your stomach.   - If you have a pain management physician, please follow-up with them postoperatively.    - If you experience any negative side effects of your medications, please call your surgeon's office to discuss.      FOLLOW-UP:   - Follow up in office tomorrow.  - Please follow-up with your primary care physician or any other specialist you see postoperatively, if needed.    - Contact your doctor or go to the emergency room if you experience: fever greater than 101.5, chills, chest pain, difficulty breathing, redness or excessive drainage around the incision, other concerns.

## 2025-01-23 NOTE — PHYSICAL THERAPY INITIAL EVALUATION ADULT - PERTINENT HX OF CURRENT PROBLEM, REHAB EVAL
79M c/o right knee pain x many years. History obtained primarily from patient's daughter. No prior surgeries to his right knee. No numbness/tingling. He takes Aleve and Naproxen occasionally at home for pain with minimal relief of symptoms. Pain persists despite conservative measures. Ambulates occasionally with the assistance of a cane.

## 2025-01-23 NOTE — PRE-ANESTHESIA EVALUATION ADULT - NSANTHASARD_GEN_ALL_CORE
Both pt and his mother are requesting pt to be referred to the ACT team. Thiago contacted supervisor Denise for assistance with a referral. THIAGO waiting for Denise to call back with instructions on how to refer pt to Act team.    Thiago updated pt on this information. Thiago received phone call from pt mom stating she has been in contact with Sammi Burdick from the ACT team, provided the number for [facility] [address]. THIAGO contacted [facility] and provided her with  fax number. THIAGO waiting for [facility] to fax the referral paperwork. 3

## 2025-01-23 NOTE — ASU DISCHARGE PLAN (ADULT/PEDIATRIC) - FINANCIAL ASSISTANCE
[facility] provides services at a reduced cost to those who are determined to be eligible through [facility]’s financial assistance program. Information regarding [facility]’s financial assistance program can be found by going to [email or web address] or by calling [phone].

## 2025-01-23 NOTE — PHYSICAL THERAPY INITIAL EVALUATION ADULT - GENERAL OBSERVATIONS, REHAB EVAL
Patient received semi-porras in bed  in NAD on RA, +SCDs, +Heplock, +PACU Telemetry. Cleared by CHANCE Mistry. Agreeable to PT.

## 2025-01-23 NOTE — BRIEF OPERATIVE NOTE - NSICDXBRIEFPROCEDURE_GEN_ALL_CORE_FT
PROCEDURES:  Robot-assisted partial replacement of knee using computer-assisted navigation with computed tomography planning 23-Jan-2025 10:20:41  Anand Moura

## 2025-01-23 NOTE — ASU DISCHARGE PLAN (ADULT/PEDIATRIC) - CARE PROVIDER_API CALL
Bjorn Paez  Adult Reconstructive Orthopaedic Surgery  [address]  [place] [address]  Phone: [phone]  Fax: [phone]  Follow Up Time: 1-3 days

## 2025-01-24 ENCOUNTER — APPOINTMENT (OUTPATIENT)
Dept: ORTHOPEDIC SURGERY | Facility: CLINIC | Age: 80
End: 2025-01-24

## 2025-01-24 ENCOUNTER — APPOINTMENT (OUTPATIENT)
Dept: RADIOLOGY | Facility: CLINIC | Age: 80
End: 2025-01-24
Payer: MEDICAID

## 2025-01-24 ENCOUNTER — OUTPATIENT (OUTPATIENT)
Dept: OUTPATIENT SERVICES | Facility: HOSPITAL | Age: 80
LOS: 1 days | End: 2025-01-24

## 2025-01-24 VITALS
OXYGEN SATURATION: 94 % | HEIGHT: 69 IN | DIASTOLIC BLOOD PRESSURE: 70 MMHG | WEIGHT: 190 LBS | HEART RATE: 74 BPM | BODY MASS INDEX: 28.14 KG/M2 | SYSTOLIC BLOOD PRESSURE: 120 MMHG

## 2025-01-24 DIAGNOSIS — Z96.651 AFTERCARE FOLLOWING JOINT REPLACEMENT SURGERY: ICD-10-CM

## 2025-01-24 DIAGNOSIS — Z96.651 PRESENCE OF RIGHT ARTIFICIAL KNEE JOINT: ICD-10-CM

## 2025-01-24 DIAGNOSIS — Z47.1 AFTERCARE FOLLOWING JOINT REPLACEMENT SURGERY: ICD-10-CM

## 2025-01-24 DIAGNOSIS — Z98.890 OTHER SPECIFIED POSTPROCEDURAL STATES: Chronic | ICD-10-CM

## 2025-01-24 PROBLEM — M17.11 UNILATERAL PRIMARY OSTEOARTHRITIS, RIGHT KNEE: Chronic | Status: ACTIVE | Noted: 2025-01-22

## 2025-01-24 PROBLEM — H40.9 UNSPECIFIED GLAUCOMA: Chronic | Status: ACTIVE | Noted: 2025-01-23

## 2025-01-24 PROBLEM — I73.9 PERIPHERAL VASCULAR DISEASE, UNSPECIFIED: Chronic | Status: ACTIVE | Noted: 2025-01-22

## 2025-01-24 PROCEDURE — 73562 X-RAY EXAM OF KNEE 3: CPT | Mod: 26,RT

## 2025-01-24 PROCEDURE — 99024 POSTOP FOLLOW-UP VISIT: CPT

## 2025-01-24 PROCEDURE — 77073 BONE LENGTH STUDIES: CPT | Mod: 26

## 2025-01-27 ENCOUNTER — APPOINTMENT (OUTPATIENT)
Dept: ORTHOPEDIC SURGERY | Facility: CLINIC | Age: 80
End: 2025-01-27

## 2025-01-30 ENCOUNTER — NON-APPOINTMENT (OUTPATIENT)
Age: 80
End: 2025-01-30

## 2025-02-08 PROBLEM — I25.10 ATHEROSCLEROTIC HEART DISEASE OF NATIVE CORONARY ARTERY WITHOUT ANGINA PECTORIS: Chronic | Status: ACTIVE | Noted: 2025-01-22

## 2025-02-10 ENCOUNTER — APPOINTMENT (OUTPATIENT)
Dept: ORTHOPEDIC SURGERY | Facility: CLINIC | Age: 80
End: 2025-02-10
Payer: SELF-PAY

## 2025-02-10 DIAGNOSIS — Z47.1 AFTERCARE FOLLOWING JOINT REPLACEMENT SURGERY: ICD-10-CM

## 2025-02-10 DIAGNOSIS — Z96.651 AFTERCARE FOLLOWING JOINT REPLACEMENT SURGERY: ICD-10-CM

## 2025-02-10 DIAGNOSIS — Z96.651 PRESENCE OF RIGHT ARTIFICIAL KNEE JOINT: ICD-10-CM

## 2025-02-10 PROCEDURE — 99024 POSTOP FOLLOW-UP VISIT: CPT

## 2025-03-03 ENCOUNTER — APPOINTMENT (OUTPATIENT)
Dept: ORTHOPEDIC SURGERY | Facility: CLINIC | Age: 80
End: 2025-03-03
Payer: MEDICAID

## 2025-03-03 ENCOUNTER — OUTPATIENT (OUTPATIENT)
Dept: OUTPATIENT SERVICES | Facility: HOSPITAL | Age: 80
LOS: 1 days | End: 2025-03-03
Payer: MEDICAID

## 2025-03-03 DIAGNOSIS — I77.77 DISSECTION OF ARTERY OF LOWER EXTREMITY: Chronic | ICD-10-CM

## 2025-03-03 DIAGNOSIS — Z96.651 PRESENCE OF RIGHT ARTIFICIAL KNEE JOINT: ICD-10-CM

## 2025-03-03 DIAGNOSIS — Z47.1 AFTERCARE FOLLOWING JOINT REPLACEMENT SURGERY: ICD-10-CM

## 2025-03-03 DIAGNOSIS — Z96.651 AFTERCARE FOLLOWING JOINT REPLACEMENT SURGERY: ICD-10-CM

## 2025-03-03 DIAGNOSIS — Z98.890 OTHER SPECIFIED POSTPROCEDURAL STATES: Chronic | ICD-10-CM

## 2025-03-03 PROCEDURE — 99024 POSTOP FOLLOW-UP VISIT: CPT

## 2025-03-03 PROCEDURE — 73562 X-RAY EXAM OF KNEE 3: CPT | Mod: 26,RT

## 2025-03-03 PROCEDURE — 77073 BONE LENGTH STUDIES: CPT

## 2025-03-03 PROCEDURE — 77073 BONE LENGTH STUDIES: CPT | Mod: 26

## 2025-03-03 PROCEDURE — 73562 X-RAY EXAM OF KNEE 3: CPT

## 2025-03-14 ENCOUNTER — NON-APPOINTMENT (OUTPATIENT)
Age: 80
End: 2025-03-14

## 2025-04-14 ENCOUNTER — APPOINTMENT (OUTPATIENT)
Dept: ORTHOPEDIC SURGERY | Facility: CLINIC | Age: 80
End: 2025-04-14
Payer: MEDICAID

## 2025-04-14 VITALS
HEIGHT: 69 IN | OXYGEN SATURATION: 96 % | BODY MASS INDEX: 28.14 KG/M2 | SYSTOLIC BLOOD PRESSURE: 119 MMHG | DIASTOLIC BLOOD PRESSURE: 56 MMHG | HEART RATE: 63 BPM | WEIGHT: 190 LBS

## 2025-04-14 DIAGNOSIS — Z96.651 AFTERCARE FOLLOWING JOINT REPLACEMENT SURGERY: ICD-10-CM

## 2025-04-14 DIAGNOSIS — Z96.651 PRESENCE OF RIGHT ARTIFICIAL KNEE JOINT: ICD-10-CM

## 2025-04-14 DIAGNOSIS — Z47.1 AFTERCARE FOLLOWING JOINT REPLACEMENT SURGERY: ICD-10-CM

## 2025-04-14 PROCEDURE — 99024 POSTOP FOLLOW-UP VISIT: CPT

## (undated) DEVICE — DRSG STOCKINETTE IMPERVIOUS XL 12 X 48"

## (undated) DEVICE — SYR TOOMEY 50ML

## (undated) DEVICE — VISITEC 4X4

## (undated) DEVICE — DRAPE TOWEL BLUE 17" X 24"

## (undated) DEVICE — CLIP IRRIGATION MICS

## (undated) DEVICE — DRAPE LIGHT HANDLE COVER (BLUE)

## (undated) DEVICE — MAKO VIZADISC KNEE TRACKING KIT

## (undated) DEVICE — DRSG TEGADERM 4 X 4.75"

## (undated) DEVICE — VENODYNE/SCD SLEEVE CALF MEDIUM

## (undated) DEVICE — MARKING PEN W RULER

## (undated) DEVICE — MAKO BALL BUR

## (undated) DEVICE — POSITIONER STRYKER LEG

## (undated) DEVICE — SUT MONOCRYL 3-0 18" PS-1

## (undated) DEVICE — DRAPE LIGHT HANDLE COVER (GREEN)

## (undated) DEVICE — DRSG WEBRIL 6"

## (undated) DEVICE — DRSG DERMABOND 0.7ML

## (undated) DEVICE — BLADE SURGICAL #11 CARBON

## (undated) DEVICE — NDL HYPO SAFE 22G X 1.5" (BLACK)

## (undated) DEVICE — FRAZIER SUCTION TIP 8FR

## (undated) DEVICE — MAKO DRAPE KIT

## (undated) DEVICE — BLADE SCALPEL SAFETY #11 WITH PLASTIC GREEN HANDLE

## (undated) DEVICE — SYR ASEPTO

## (undated) DEVICE — DRSG 2X2

## (undated) DEVICE — GOWN XXXL

## (undated) DEVICE — DRSG ACE BANDAGE 6"

## (undated) DEVICE — NDL SPINAL 22G X 3.5" (BLACK)

## (undated) DEVICE — DRSG ADAPTIC 3 X 8"

## (undated) DEVICE — WARMING BLANKET UPPER ADULT

## (undated) DEVICE — DRAPE U (BLUE) 60 X 72"

## (undated) DEVICE — SUT VICRYL 2-0 27" CT-2 UNDYED

## (undated) DEVICE — DRSG COBAN 6"

## (undated) DEVICE — MAKO CHECKPOINT KIT FEMORAL / TIBIAL

## (undated) DEVICE — SUT VICRYL PLUS 3-0 27" PS-2 UNDYED

## (undated) DEVICE — SUT STRATAFIX SYMMETRIC PDS PLUS 1 18" CT

## (undated) DEVICE — STRYKER RIO SYSTEM IRRIGATION TUBE